# Patient Record
Sex: MALE | Race: BLACK OR AFRICAN AMERICAN | URBAN - METROPOLITAN AREA
[De-identification: names, ages, dates, MRNs, and addresses within clinical notes are randomized per-mention and may not be internally consistent; named-entity substitution may affect disease eponyms.]

---

## 2017-12-15 ENCOUNTER — OFFICE VISIT (OUTPATIENT)
Dept: FAMILY MEDICINE | Facility: CLINIC | Age: 27
End: 2017-12-15
Payer: MEDICARE

## 2017-12-15 ENCOUNTER — RADIANT APPOINTMENT (OUTPATIENT)
Dept: MRI IMAGING | Facility: CLINIC | Age: 27
End: 2017-12-15
Attending: INTERNAL MEDICINE
Payer: MEDICARE

## 2017-12-15 ENCOUNTER — RADIANT APPOINTMENT (OUTPATIENT)
Dept: GENERAL RADIOLOGY | Facility: CLINIC | Age: 27
End: 2017-12-15
Attending: INTERNAL MEDICINE
Payer: MEDICARE

## 2017-12-15 VITALS
SYSTOLIC BLOOD PRESSURE: 134 MMHG | BODY MASS INDEX: 41.75 KG/M2 | DIASTOLIC BLOOD PRESSURE: 89 MMHG | OXYGEN SATURATION: 94 % | HEART RATE: 81 BPM | TEMPERATURE: 98.7 F | WEIGHT: 315 LBS | HEIGHT: 73 IN

## 2017-12-15 DIAGNOSIS — S93.401A SEVERE ANKLE SPRAIN, RIGHT, INITIAL ENCOUNTER: Primary | ICD-10-CM

## 2017-12-15 DIAGNOSIS — S93.401A SEVERE ANKLE SPRAIN, RIGHT, INITIAL ENCOUNTER: ICD-10-CM

## 2017-12-15 PROBLEM — I10 ESSENTIAL HYPERTENSION: Status: ACTIVE | Noted: 2017-02-06

## 2017-12-15 PROBLEM — E66.01 MORBID OBESITY (H): Status: ACTIVE | Noted: 2017-12-15

## 2017-12-15 PROBLEM — R73.03 PREDIABETES: Status: ACTIVE | Noted: 2017-08-08

## 2017-12-15 PROBLEM — Z23 NEED FOR PROPHYLACTIC VACCINATION WITH TETANUS-DIPHTHERIA (TD): Status: ACTIVE | Noted: 2017-12-15

## 2017-12-15 PROCEDURE — 73610 X-RAY EXAM OF ANKLE: CPT | Mod: RT

## 2017-12-15 PROCEDURE — 99204 OFFICE O/P NEW MOD 45 MIN: CPT | Performed by: INTERNAL MEDICINE

## 2017-12-15 PROCEDURE — 73721 MRI JNT OF LWR EXTRE W/O DYE: CPT | Mod: TC

## 2017-12-15 RX ORDER — OLANZAPINE 20 MG/1
20 TABLET ORAL AT BEDTIME
COMMUNITY

## 2017-12-15 NOTE — MR AVS SNAPSHOT
After Visit Summary   12/15/2017    Makayla Pérez    MRN: [id number]           Patient Information     Date Of Birth          1990        Visit Information        Provider Department      12/15/2017 10:40 AM Harpal Cordoba MD [facility]        Today's Diagnoses     Severe ankle sprain, right, initial encounter    -  1      Care Instructions    At [facility], we strive to deliver an exceptional experience to you, every time we see you.  If you receive a survey in the mail, please send us back your thoughts. We really do value your feedback.    Based on your medical history, these are the current health maintenance/preventive care services that you are due for (some may have been done at this visit.)  Health Maintenance Due   Topic Date Due     TETANUS IMMUNIZATION (SYSTEM ASSIGNED)  05/10/2007     INFLUENZA VACCINE (SYSTEM ASSIGNED)  09/01/2017         Suggested websites for health information:  Www[facility] : Up to date and easily searchable information on multiple topics.  Www.medlineplus.gov : medication info, interactive tutorials, watch real surgeries online  Www.familydoctor.org : good info from the Academy of Family Physicians  Www.cdc.gov : public health info, travel advisories, epidemics (H1N1)  Www.aap.org : children's health info, normal development, vaccinations  Www.health.state.[place].us : [place] dept of health, public health issues in [place], N1N1    Your care team:                            Family Medicine Internal Medicine   MD Harpal Srivastava MD Shantel Branch-Fleming, MD Katya Georgiev PA-C Nam Ho, MD Pediatrics   DIEGO Denton, LIVIA Lawson APRN MD Hannah Ortega MD Deborah Mielke, MD Kim Thein, KATLIN CNP      Clinic hours: Monday - Thursday 7 am-7 pm; Fridays 7 am-5 pm.   Urgent care: Monday - Friday 11 am-9 pm; Saturday and Sunday 9 am-5 pm.  Pharmacy :  Monday -Thursday 8 am-8 pm; Friday 8 am-6 pm; Saturday and Sunday 9 am-5 pm.     Clinic: [phone]   Pharmacy: [phone]            Follow-ups after your visit        Additional Services     ORTHOPEDICS ADULT REFERRAL       Your provider has referred you to: [facility]: [facility] [phone]    [email or web address]    Please be aware that coverage of these services is subject to the terms and limitations of your health insurance plan.  Call member services at your health plan with any benefit or coverage questions.      Please bring the following to your appointment:    >>   Any x-rays, CTs or MRIs which have been performed.  Contact the facility where they were done to arrange for  prior to your scheduled appointment.    >>   List of current medications   >>   This referral request   >>   Any documents/labs given to you for this referral                  Future tests that were ordered for you today     Open Future Orders        Priority Expected Expires Ordered    MR Ankle Right w/o Contrast Routine 12/15/2017 12/15/2018 12/15/2017            Who to contact     If you have questions or need follow up information about today's clinic visit or your schedule please contact [facility] directly at [phone].  Normal or non-critical lab and imaging results will be communicated to you by MyChart, letter or phone within 4 business days after the clinic has received the results. If you do not hear from us within 7 days, please contact the clinic through MyChart or phone. If you have a critical or abnormal lab result, we will notify you by phone as soon as possible.  Submit refill requests through [employer] or call your pharmacy and they will forward the refill request to us. Please allow 3 business days for your refill to be completed.          Additional Information About Your Visit        [employer] Information     [employer] lets you  "send messages to your doctor, view your test results, renew your prescriptions, schedule appointments and more. To sign up, go to [email or web address] . Click on \"Log in\" on the left side of the screen, which will take you to the Welcome page. Then click on \"Sign up Now\" on the right side of the page.     You will be asked to enter the access code listed below, as well as some personal information. Please follow the directions to create your username and password.     Your access code is: [email or web address]  Expires: 3/15/2018 12:32 PM     Your access code will  in 90 days. If you need help or a new code, please call your [place] clinic or [phone].        Care EveryWhere ID     This is your Care EveryWhere ID. This could be used by other organizations to access your [place] medical records  [id number]        Your Vitals Were     Pulse Temperature Height Pulse Oximetry BMI (Body Mass Index)       81 98.7  F (37.1  C) (Oral) 6' 1\" (1.854 m) 94% 48.68 kg/m2        Blood Pressure from Last 3 Encounters:   12/15/17 134/89    Weight from Last 3 Encounters:   12/15/17 (!) 369 lb (167.4 kg)              We Performed the Following     ORTHOPEDICS ADULT REFERRAL     XR Ankle Right G/E 3 Views          Today's Medication Changes          These changes are accurate as of: 12/15/17 12:32 PM.  If you have any questions, ask your nurse or doctor.               Start taking these medicines.        Dose/Directions    diclofenac 50 MG EC tablet   Commonly known as:  VOLTAREN   Used for:  Severe ankle sprain, right, initial encounter   Started by:  Harpal Cordoba MD        Dose:  50 mg   Take 1 tablet (50 mg) by mouth 3 times daily (with meals)   Quantity:  90 tablet   Refills:  1            Where to get your medicines      These medications were sent to [facility] - [place], [place] - [address]  [address] [facility] [address]     Phone:  [phone]     diclofenac 50 MG EC tablet "                Primary Care Provider Fax #    Provider Not In System [phone]                Equal Access to Services     YOVANIESTRELLA MATEO : Hadii aad ku haddeannabrittany Vargas, wadomingoda sterling, ardhamarely sandravanessafreddie velez, jessica encarnacionsamirlenka whitt . So [facility] [phone].    ATENCIÓN: Si habla español, tiene a luna disposición servicios gratuitos de asistencia lingüística. Llame al [phone].    We comply with applicable federal civil rights laws and [place] laws. We do not discriminate on the basis of race, color, national origin, age, disability, sex, sexual orientation, or gender identity.            Thank you!     Thank you for choosing [facility]  for your care. Our goal is always to provide you with excellent care. Hearing back from our patients is one way we can continue to improve our services. Please take a few minutes to complete the written survey that you may receive in the mail after your visit with us. Thank you!             Your Updated Medication List - Protect others around you: Learn how to safely use, store and throw away your medicines at www.disposemymeds.org.          This list is accurate as of: 12/15/17 12:32 PM.  Always use your most recent med list.                   Brand Name Dispense Instructions for use Diagnosis    BENADRYL PO           diclofenac 50 MG EC tablet    VOLTAREN    90 tablet    Take 1 tablet (50 mg) by mouth 3 times daily (with meals)    Severe ankle sprain, right, initial encounter       LISINOPRIL-HYDROCHLOROTHIAZIDE PO           TOPAMAX PO           ZYPREXA 20 MG tablet   Generic drug:  OLANZapine      Take 20 mg by mouth At Bedtime

## 2017-12-15 NOTE — LETTER
December 19, 2017      Makayla Pérez  [address]  [facility] [address]        Dear ,    We are writing to inform you of your test results.    Your MRI of right ankle shows no evidence of any fractures.It shows complete tear of tibiofibular ligament (located at the lateral aspect of right ankle).   It also shows partial thickness tear of talofibular ligament (located in front of the right ankle).Schedule Ortho consultation by calling [phone], as discussed during visit.   Continue Diclofenac and apply ice frequently. For any questions, you may call my office at [phone].     Resulted Orders   MR Ankle Right w/o Contrast    Narrative    MR ANKLE RIGHT WITHOUT CONTRAST   12/15/2017 3:34 PM    HISTORY:  Right ankle pain since an injury yesterday.    COMPARISON: Radiographs earlier today.    TECHNIQUE: Multiplanar MR imaging was performed without contrast.    FINDINGS:    Osseous and Cartilaginous Structures: There is a small ossicle just  anterior to the inferior tip of the lateral malleolus. No fracture or  osseous lesion is demonstrated. No abnormal marrow signal intensity is  identified. The cartilage surfaces are well preserved. No talar dome  osteochondral lesion.    Posterior Tibial and Flexor Tendons:  No tear or significant  tendinosis of the posterior tibial tendon, flexor digitorum longus  tendon, or flexor hallucis longus tendon.     Peroneal Tendons:  No tear or significant tendinosis of the peroneal  brevis tendon or peroneal longus tendon.     Achilles Tendon:  No tear or significant tendinosis.     Extensor Tendons:  No tear or significant tendinosis of the anterior  tibial tendon, extensor hallucis longus tendon, or extensor digitorum  longus tendon.     Lateral Ligaments:  The fibular attachment of the anterior talofibular  ligament is not clearly seen. I suspect there is a tear of the  ligament, although this may be only partial thickness. The anterior  tibiofibular ligament  is completely torn. The calcaneofibular,  posterior talofibular, and posterior tibiofibular ligaments are  unremarkable.     Medial Deltoid Ligamentous Complex:  Unremarkable.     Plantar Fascia:  Unremarkable, with no findings to suggest active  plantar fasciitis.     Additional Findings: No significant  tibiotalar joint effusion. No  retrocalcaneal bursitis. No mass within the tarsal tunnel. The sinus  tarsi is unremarkable. There is prominent edema in the subcutaneous  tissues around the ankle, predominantly laterally. No other soft  tissue abnormality is seen.      Impression    IMPRESSION:    1. Complete tear of the anterior tibiofibular ligament with a  high-grade likely partial thickness tear of the anterior talofibular  ligament.  2. Diffuse surrounding soft tissue edema.    YAW IBRAHIM MD       If you have any questions or concerns, please call the clinic at the number listed above.       Sincerely,    Dr. Cordoba

## 2017-12-15 NOTE — PATIENT INSTRUCTIONS
At [facility], we strive to deliver an exceptional experience to you, every time we see you.  If you receive a survey in the mail, please send us back your thoughts. We really do value your feedback.    Based on your medical history, these are the current health maintenance/preventive care services that you are due for (some may have been done at this visit.)  Health Maintenance Due   Topic Date Due     TETANUS IMMUNIZATION (SYSTEM ASSIGNED)  05/10/2007     INFLUENZA VACCINE (SYSTEM ASSIGNED)  09/01/2017         Suggested websites for health information:  Www.[employer].org : Up to date and easily searchable information on multiple topics.  Www.[employer].gov : medication info, interactive tutorials, watch real surgeries online  Www.familydoctor.org : good info from the Academy of Family Physicians  Www.cdc.gov : public health info, travel advisories, epidemics (H1N1)  Www.aap.org : children's health info, normal development, vaccinations  Www.health.[facility].us : [place] dept of health, public health issues in [place], N1N1    Your care team:                            Family Medicine Internal Medicine   MD Harpal Srivastava MD Shantel Branch-Fleming, MD Katya Georgiev PA-C Nam Ho, MD Pediatrics   DIEGO Denton, LIVIA Lawson APRN CNP   MD Hannah Cabrera MD Deborah Mielke, MD Kim Thein, APRN CNP      Clinic hours: Monday - Thursday 7 am-7 pm; Fridays 7 am-5 pm.   Urgent care: Monday - Friday 11 am-9 pm; Saturday and Sunday 9 am-5 pm.  Pharmacy : Monday -Thursday 8 am-8 pm; Friday 8 am-6 pm; Saturday and Sunday 9 am-5 pm.     Clinic: [phone]   Pharmacy: [phone]

## 2017-12-15 NOTE — PROGRESS NOTES
SUBJECTIVE:   Makayla Pérez is a 27 year old male who presents to clinic today for the following health issues    Joint Pain    Onset: 1 day    Description:   Location: right foot  Character: Sharp and Stabbing    Intensity: moderate    Progression of Symptoms: same    Accompanying Signs & Symptoms:swelling and numbness of right ankle.    History:   Previous similar pain: no       Precipitating factors:   Trauma or overuse: YES- twisted right ankle while playing at home.    Alleviating factors:  Improved by: none  Therapies Tried and outcome: none            Problem list and histories reviewed & adjusted, as indicated.  Additional history: as documented    Patient Active Problem List   Diagnosis     Morbid obesity (H)     Severe ankle sprain, right, initial encounter     Antisocial personality disorder     Essential hypertension     Prediabetes     Schizoaffective disorder, bipolar type (H)     History reviewed. No pertinent surgical history.    Social History   Substance Use Topics     Smoking status: Current Every Day Smoker     Packs/day: 0.50     Types: Cigarettes     Smokeless tobacco: Never Used     Alcohol use No     History reviewed. No pertinent family history.      Not on File  No lab results found.   BP Readings from Last 3 Encounters:   12/15/17 134/89    Wt Readings from Last 3 Encounters:   12/15/17 (!) 167.4 kg (369 lb)                ROS:  CONSTITUTIONAL:POSITIVE  for morbid obesity.  I: NEGATIVE for worrisome rashes, moles or lesions  E: NEGATIVE for vision changes or irritation  E/M: NEGATIVE for ear, mouth and throat problems  R: NEGATIVE for significant cough or SOB  CV: NEGATIVE for chest pain, palpitations or peripheral edema  GI: NEGATIVE for nausea, abdominal pain, heartburn, or change in bowel habits  : NEGATIVE for frequency, dysuria, or hematuria  MUSCULOSKELETAL:As above.  N: NEGATIVE for weakness, dizziness or paresthesias  E: NEGATIVE for temperature intolerance, skin/hair  "changes  H: NEGATIVE for bleeding problems  PSYCHIATRIC: POSITIVE for schizoaffective bipolar disorder.    OBJECTIVE:     /89 (BP Location: Left arm, Patient Position: Chair, Cuff Size: Adult Large)  Pulse 81  Temp 98.7  F (37.1  C) (Oral)  Ht 1.854 m (6' 1\")  Wt (!) 167.4 kg (369 lb)  SpO2 94%  BMI 48.68 kg/m2  Body mass index is 48.68 kg/(m^2).  GENERAL: healthy, alert and no distress  EYES: Eyes grossly normal to inspection and conjunctivae and sclerae normal  HENT: normal cephalic/atraumatic and oral mucous membranes moist  RESP: lungs clear to auscultation - no rales, rhonchi or wheezes  CV: regular rate and rhythm, normal S1 S2, no S3 or S4, no murmur, click or rub, no peripheral edema and peripheral pulses strong  ABDOMEN: soft, nontender, no hepatosplenomegaly, no masses and bowel sounds normal  MS: Exquisite swelling and tenderness of right ankle and right foot.  SKIN: no suspicious lesions or rashes  NEURO: Normal strength and tone, mentation intact and speech normal  PSYCH: mentation appears normal, affect normal/bright    Diagnostic Test Results:  Results for orders placed or performed in visit on 12/15/17   XR Ankle Right G/E 3 Views    Narrative    ANKLE RIGHT THREE OR MORE VIEWS  12/15/2017 11:42 AM     HISTORY: Severe ankle sprain, right, initial encounter.    COMPARISON: None.      Impression    IMPRESSION: Lateral soft tissue swelling. Ankle mortise intact.  Well-corticated ossicle adjacent to the distal fibula. No definite  acute fracture.    RAYA WELLINGTON MD       ASSESSMENT/PLAN:     (S93.401A) Severe ankle sprain, right, initial encounter  (primary encounter diagnosis)  Comment: Despite severe right ankle swelling, there is o evidence of any fractures. Work ability letter provided after visit (no work for two weeks but adjust accordingly based on current condition). MRI justified as plain imaging does not wholly explained severe swelling.  Plan: XR Ankle Right G/E 3 Views, MR " Ankle Right w/o         Contrast, ORTHOPEDICS ADULT REFERRAL, order for      DME, diclofenac (VOLTAREN) 50 MG         EC tablet    Follow-up visit if condition worsens.    Harpal Cordoba MD  [facility]

## 2017-12-19 ENCOUNTER — TELEPHONE (OUTPATIENT)
Dept: FAMILY MEDICINE | Facility: CLINIC | Age: 27
End: 2017-12-19

## 2017-12-19 DIAGNOSIS — S93.401A SEVERE ANKLE SPRAIN, RIGHT, INITIAL ENCOUNTER: ICD-10-CM

## 2017-12-19 NOTE — TELEPHONE ENCOUNTER
Notes Recorded by Harpal Cordoba MD on 12/17/2017 at 9:27 PM  MRI of right ankle shows no evidence of any fractures.  It shows complete tear of tibiofibular ligament (located at the lateral aspect of right ankle).  It also shows partial thickness tear of talofibular ligament (located in front of the right ankle).  Schedule Ortho consultation by calling [phone] (remind patient as it was explained during his visit).  Continue Diclofenac and apply ice frequently.    (Call patient)    Patient informed of the above per provider documentation.     1) Patient states he has an appointment on Thursday. Upon review of chart it shows he has an appointment with podiatry. Provider, does patient need podiatry or was patient scheduled incorrectly? Patient did verbalize understanding he needs to schedule with Ortho and said he will do so.   3)Patient is having a hard time with the news of his torn ligaments and he said he cannot walk with the crutches. He would like to know if he can or should put weight on his right foot.   4) Patient wants his diclofenac sent to [facility]. I contacted our pharmacy and they think patient did not have his insurance card so that is why he did not get the Rx from our clinic pharmacy. Rx re-sent per patient's request to [facility].       Routing to provider to review and advise.   Lizeth Posey RN

## 2017-12-20 NOTE — TELEPHONE ENCOUNTER
Called and spoke with patient. Reviewed notes below per Dr. Cordoba regarding keeping appointment with podiatry, recommending non-weight bearing until appointment, and taking diclofenac as prescribed. Patient verbalized understanding. Patient concerned and worried he has worsened his injury as he has been putting weight on ankle since OV and not using the crutches as they are hard for him to maneuver and get around with. Reiterated with patient that he should be using them and stay off ankle as best as can until appointment tomorrow with podiatry. Patient agreed. Patient will also contact pharmacy for medication delivery. Writer also educated patient about using ice for treatment of swelling and to elevate ankle as often as able. Patient agreed.    Neena Jaramillo RN  [facility] Triage

## 2017-12-20 NOTE — TELEPHONE ENCOUNTER
1) Keep appointment with Podiatry on Thursday.  2) Due severe swelling of right ankle, recommend non-weight bearing until appointment with Podiatry.  3) Take Diclofenac once he receives it.

## 2017-12-22 ENCOUNTER — OFFICE VISIT (OUTPATIENT)
Dept: PODIATRY | Facility: CLINIC | Age: 27
End: 2017-12-22
Payer: MEDICARE

## 2017-12-22 VITALS — BODY MASS INDEX: 48.82 KG/M2 | HEART RATE: 78 BPM | OXYGEN SATURATION: 98 % | WEIGHT: 315 LBS

## 2017-12-22 DIAGNOSIS — S93.401A SEVERE ANKLE SPRAIN, RIGHT, INITIAL ENCOUNTER: Primary | ICD-10-CM

## 2017-12-22 PROCEDURE — 99203 OFFICE O/P NEW LOW 30 MIN: CPT | Performed by: PODIATRIST

## 2017-12-22 NOTE — NURSING NOTE
"Chief Complaint   Patient presents with     Ankle Pain     right ankle torn ligament       Initial Pulse 78  Wt (!) 370 lb (167.8 kg)  SpO2 98%  BMI 48.82 kg/m2 Estimated body mass index is 48.82 kg/(m^2) as calculated from the following:    Height as of 12/15/17: 6' 1\" (1.854 m).    Weight as of this encounter: 370 lb (167.8 kg).  Medication Reconciliation: complete  "

## 2017-12-22 NOTE — MR AVS SNAPSHOT
After Visit Summary   12/22/2017    Makayla Pérez    MRN: [id number]           Patient Information     Date Of Birth          1990        Visit Information        Provider Department      12/22/2017 9:00 AM Jered Miranda, [facility]        Care Instructions    SMOKING CESSATION  What's in cigarette smoke? - Cigarette smoke contains over 4,000 chemicals. Nicotine is one of the main ingredients which is an insecticide/herbicide. It is poisonous to our nervous system, increases blood clotting risk, and decreases the body's defenses to fight off infection. Another chemical is Carbon Monoxide is an asphyxiating gas that permanently binds to blood cells and blocks the transport of oxygen. This leads to tissue death and decreases your metabolism. Tar is a chemical that coats your lungs and trachea which impairs new oxygen coming in and carbon dioxide getting out of your body.   How does smoking impact surgery? - Smoking is particularly hazardous with regards to surgery. Surgery puts stress on the body and a smoker's body is already under strain from these chemicals. Putting the two together, especially for an elective surgery, could be a recipe for disaster. Smoking before and after surgery increases your risk of heart problems, slow wound healing, delayed bone healing, blood clots, wound infection and anesthesia complications.   What are the benefits of quitting? - In 20 minutes your blood pressure will drop back down to normal. In 8 hours the carbon monoxide (a toxic gas) levels in your blood stream will drop by half, and oxygen levels will return to normal. In 48 hours your chance of having a heart attack will have decreased. All nicotine will have left your body. Your sense of taste and smell will return to a normal level. In 72 hours your bronchial tubes will relax, and your energy levels will increase. In 2 weeks your circulation will increase, and it will continue  to improve for the next 10 weeks.    Recommendations for elective surgery - Ideally, patients should quit smoking 8 weeks before and at least 2 weeks after elective surgery in order to avoid complications. Simply cutting back on the amount of cigarettes smoked per day does not offer any benefit or decrease the risk of poor wound healing, heart problems, and infection. Smokers should also start taking Vitamin C and B for two weeks before surgery and two weeks after surgery.    Ways to Stop Smokin. Nicotine patches, lozenges, or gum  2. Support Groups  3. Medications (see below)    List of Medications:  1. Varenicline Tartrate (CHANTIX)   2. Bupropion HCL (WELLBUTRIN, ZYBAN) - note: make sure Wellbutrin is for smoking cessation and not other issues   3. Nicotine Patch (NICODERM)   4. Nicotine Inhaler (NICOTROL)   5. Nicotine Gum Nicotine Polacrilex   6. Nicotine Lozenge: Nicotine Polacrilex (COMMIT)   * [place] offers a smoking support group as well!  Please visit: [email or web address]  If you are interested in these, ask about getting a prescription or talk to your primary care doctor about what may be the best way for you to quit.       We wish you continued good healing. If you have any questions or concerns, please do not hesitate to contact us at [phone]      Please remember to call and schedule a follow up appointment if one was recommended at your earliest convenience.   PODIATRY CLINIC HOURS  TELEPHONE NUMBER    Dr. Jered Miranda D.P.M [facility]    Clinics:  [facility]        Charlotte Parisi MA  Medical Assistant  Tuesday 1PM-6PM  [place]  Wednesday 7AM-2PM  [facility]  Thursday 10AM-6PM  [place] 7AM-345PM  [facility]  Specialty schedulers:   [phone] to make an appointment with any Specialty Provider.        Urgent Care locations:    [facility] Monday-Friday 5 pm -  "9 pm. Saturday-Sunday 9 am -5pm    Monday-Friday 11 am - 9 pm Saturday 9 am - 5 pm     Monday-Sunday 12 noon-8PM [phone]     If you need a medication refill, please contact us you may need lab work and/or a follow up visit prior to your refill (i.e. Antifungal medications).    Quintileshart (secure e-mail communication and access to your chart) to send a message or to make an appointment.    If MRI needed please call Murali Rendon at [phone]        Weight management plan: Patient was referred to their PCP to discuss a diet and exercise plan.            Follow-ups after your visit        Your next 10 appointments already scheduled     Dec 22, 2017  9:00 AM CST   [facility] D[facility]   [facility]    [address] [place] [id number] [phone]              Who to contact     If you have questions or need follow up information about today's clinic visit or your schedule please contact [facility] directly at [phone].  Normal or non-critical lab and imaging results will be communicated to you by MyChart, letter or phone within 4 business days after the clinic has received the results. If you do not hear from us within 7 days, please contact the clinic through MyChart or phone. If you have a critical or abnormal lab result, we will notify you by phone as soon as possible.  Submit refill requests through [employer] or call your pharmacy and they will forward the refill request to us. Please allow 3 business days for your refill to be completed.          Additional Information About Your Visit        Quintileshart Information     Vimaginot lets you send messages to your doctor, view your test results, renew your prescriptions, schedule appointments and more. To sign up, go to [email or web address] . Click on \"Log in\" on the left side of the screen, " "which will take you to the Welcome page. Then click on \"Sign up Now\" on the right side of the page.     You will be asked to enter the access code listed below, as well as some personal information. Please follow the directions to create your username and password.     Your access code is: [email or web address]  Expires: 3/15/2018 12:32 PM     Your access code will  in 90 days. If you need help or a new code, please call your [facility] or [phone].        Care EveryWhere ID     This is your Care EveryWhere ID. This could be used by other organizations to access your [place] medical records  [id number]        Your Vitals Were     Pulse Pulse Oximetry BMI (Body Mass Index)             78 98% 48.82 kg/m2          Blood Pressure from Last 3 Encounters:   12/15/17 134/89    Weight from Last 3 Encounters:   17 (!) 370 lb (167.8 kg)   12/15/17 (!) 369 lb (167.4 kg)              Today, you had the following     No orders found for display       Primary Care Provider Fax #    Provider Not In System [phone]                Equal Access to Services     [facility]: Hadii aad ku hadasho Sonahomyali, waaxda luqadaha, qaybta kaalmada adeterriyada, jessica whitt . So [facility] [phone].    ATENCIÓN: Si habla español, tiene a luna disposición servicios gratuitos de asistencia lingüística. Llame al [phone].    We comply with applicable federal civil rights laws and [place] laws. We do not discriminate on the basis of race, color, national origin, age, disability, sex, sexual orientation, or gender identity.            Thank you!     Thank you for choosing [facility]  for your care. Our goal is always to provide you with excellent care. Hearing back from our patients is one way we can continue to improve our services. Please take a few minutes to complete the written survey that you may receive in the mail after your visit with us. Thank you!             Your " Updated Medication List - Protect others around you: Learn how to safely use, store and throw away your medicines at www.disposemymeds.org.          This list is accurate as of: 12/22/17  8:56 AM.  Always use your most recent med list.                   Brand Name Dispense Instructions for use Diagnosis    BENADRYL PO           diclofenac 50 MG EC tablet    VOLTAREN    90 tablet    Take 1 tablet (50 mg) by mouth 3 times daily (with meals)    Severe ankle sprain, right, initial encounter       LISINOPRIL-HYDROCHLOROTHIAZIDE PO           order for DME     2 each    Equipment being ordered: Crutches    Severe ankle sprain, right, initial encounter       TOPAMAX PO           ZYPREXA 20 MG tablet   Generic drug:  OLANZapine      Take 20 mg by mouth At Bedtime

## 2017-12-22 NOTE — PROGRESS NOTES
Subjective:    Patient seen as a new patient consult from Dr. Cordoba and is seen today  for right ankle sprain.  Had inversion sprain while roughhousing with a friend on 12/14/17.  Had pain and edema lateral ankle.  Aggravated by activity and relieved by rest.  Seen in clinic, xrays taken, and mri ordered because so painful.  Her to discussed results.  Lives in group home.  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  Using ace bandage and wearing shoes.  No past problems here before.  Smoker.             ROS:  denies numbness, erythema, shortness of breath       Allergies   Allergen Reactions     Haloperidol Other (See Comments)     Risperidone Other (See Comments)       Current Outpatient Prescriptions   Medication Sig Dispense Refill     diclofenac (VOLTAREN) 50 MG EC tablet Take 1 tablet (50 mg) by mouth 3 times daily (with meals) 90 tablet 1     OLANZapine (ZYPREXA) 20 MG tablet Take 20 mg by mouth At Bedtime       LISINOPRIL-HYDROCHLOROTHIAZIDE PO        Topiramate (TOPAMAX PO)        DiphenhydrAMINE HCl (BENADRYL PO)        order for DME Equipment being ordered: Crutches 2 each 0       Patient Active Problem List   Diagnosis     Morbid obesity (H)     Severe ankle sprain, right, initial encounter     Antisocial personality disorder     Essential hypertension     Prediabetes     Schizoaffective disorder, bipolar type (H)       No past medical history on file.    No past surgical history on file.    No family history on file.    Social History   Substance Use Topics     Smoking status: Current Every Day Smoker     Packs/day: 0.50     Types: Cigarettes     Smokeless tobacco: Never Used     Alcohol use No       Objective:    Pulse 78  Wt (!) 370 lb (167.8 kg)  SpO2 98%  BMI 48.82 kg/m2.  Patient pleasant to talk with and in no apparent distress.   Wearing worn pair of shoes today.  CRT < 3 seconds all toes.  No varicosities noted.   +2/4 DP & PT bilateral, sensation to light touch is intact.  Achilles reflex 2/4  bilateral.  Normal arch with weight bearing.  Muscle strength 5/5 in all compartments.  No pain with stressing any tendons.  Normal ROM all forefoot and rearfoot joints.     Lateral ankle slight edema.  negative ecchymosis  negative erythema  negative pain at TMTJs or styloid process.  negative Achilles or calcaneal tubercle pain  negative medial ankle pain  positive pain AITF ligament and this seems to be where most of his pain is.  Slight pain over ATFL  No pain CFL  negative pain with stressing or palpation peroneal tendons  negative peroneal subluxation  negative pain over medial or lateral malleoli    ANKLE RIGHT THREE OR MORE VIEWS  12/15/2017 11:42 AM      HISTORY: Severe ankle sprain, right, initial encounter.     COMPARISON: None.         IMPRESSION: Lateral soft tissue swelling. Ankle mortise intact.  Well-corticated ossicle adjacent to the distal fibula. No definite  acute fracture.    MR ANKLE RIGHT WITHOUT CONTRAST   12/15/2017 3:34 PM     HISTORY:  Right ankle pain since an injury yesterday.     COMPARISON: Radiographs earlier today.     TECHNIQUE: Multiplanar MR imaging was performed without contrast.     FINDINGS:     Osseous and Cartilaginous Structures: There is a small ossicle just  anterior to the inferior tip of the lateral malleolus. No fracture or  osseous lesion is demonstrated. No abnormal marrow signal intensity is  identified. The cartilage surfaces are well preserved. No talar dome  osteochondral lesion.     Posterior Tibial and Flexor Tendons:  No tear or significant  tendinosis of the posterior tibial tendon, flexor digitorum longus  tendon, or flexor hallucis longus tendon.      Peroneal Tendons:  No tear or significant tendinosis of the peroneal  brevis tendon or peroneal longus tendon.      Achilles Tendon:  No tear or significant tendinosis.      Extensor Tendons:  No tear or significant tendinosis of the anterior  tibial tendon, extensor hallucis longus tendon, or extensor  digitorum  longus tendon.      Lateral Ligaments:  The fibular attachment of the anterior talofibular  ligament is not clearly seen. I suspect there is a tear of the  ligament, although this may be only partial thickness. The anterior  tibiofibular ligament is completely torn. The calcaneofibular,  posterior talofibular, and posterior tibiofibular ligaments are  unremarkable.      Medial Deltoid Ligamentous Complex:  Unremarkable.      Plantar Fascia:  Unremarkable, with no findings to suggest active  plantar fasciitis.      Additional Findings: No significant  tibiotalar joint effusion. No  retrocalcaneal bursitis. No mass within the tarsal tunnel. The sinus  tarsi is unremarkable. There is prominent edema in the subcutaneous  tissues around the ankle, predominantly laterally. No other soft  tissue abnormality is seen.         IMPRESSION:    1. Complete tear of the anterior tibiofibular ligament with a  high-grade likely partial thickness tear of the anterior talofibular  ligament.  2. Diffuse surrounding soft tissue edema.    Assessment:  Right high ankle sprain                          Right ATF rupture       Plan:  X-rays and MRI personally reviewed.  Discussed etiology and treatment options with the patient in detail.  Warned that nader ankle sprain with rupture of ligament are slow to heal.   Discussed PRICE.   Dispensed F8 ankle brace to walk in and this felt very comfortable, secure.   No work for next two weeks, and then will try to work again using ankle brace and sitting when needed.  Return to clinic in 3 weeks.   Thank you for allowing me participate in the care of this patient.        Jered Miranda DPM, FACFAS

## 2017-12-22 NOTE — LETTER
12/22/2017         RE: Makayla Pérez  [address] [place] [address]        Dear Colleague,    Thank you for referring your patient, Makayla Pérez, to the [facility]. Please see a copy of my visit note below.    Subjective:    Patient seen as a new patient consult from Dr. Cordoba and is seen today  for right ankle sprain.  Had inversion sprain while roughhousing with a friend on 12/14/17.  Had pain and edema lateral ankle.  Aggravated by activity and relieved by rest.  Seen in clinic, xrays taken, and mri ordered because so painful.  Her to discussed results.  Lives in group home.  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  Using ace bandage and wearing shoes.  No past problems here before.  Smoker.             ROS:  denies numbness, erythema, shortness of breath       Allergies   Allergen Reactions     Haloperidol Other (See Comments)     Risperidone Other (See Comments)       Current Outpatient Prescriptions   Medication Sig Dispense Refill     diclofenac (VOLTAREN) 50 MG EC tablet Take 1 tablet (50 mg) by mouth 3 times daily (with meals) 90 tablet 1     OLANZapine (ZYPREXA) 20 MG tablet Take 20 mg by mouth At Bedtime       LISINOPRIL-HYDROCHLOROTHIAZIDE PO        Topiramate (TOPAMAX PO)        DiphenhydrAMINE HCl (BENADRYL PO)        order for DME Equipment being ordered: Crutches 2 each 0       Patient Active Problem List   Diagnosis     Morbid obesity (H)     Severe ankle sprain, right, initial encounter     Antisocial personality disorder     Essential hypertension     Prediabetes     Schizoaffective disorder, bipolar type (H)       No past medical history on file.    No past surgical history on file.    No family history on file.    Social History   Substance Use Topics     Smoking status: Current Every Day Smoker     Packs/day: 0.50     Types: Cigarettes     Smokeless tobacco: Never Used     Alcohol use No       Objective:    Pulse 78  Wt (!) 370 lb (167.8 kg)  SpO2 98%   BMI 48.82 kg/m2.  Patient pleasant to talk with and in no apparent distress.   Wearing worn pair of shoes today.  CRT < 3 seconds all toes.  No varicosities noted.   +2/4 DP & PT bilateral, sensation to light touch is intact.  Achilles reflex 2/4 bilateral.  Normal arch with weight bearing.  Muscle strength 5/5 in all compartments.  No pain with stressing any tendons.  Normal ROM all forefoot and rearfoot joints.     Lateral ankle slight edema.  negative ecchymosis  negative erythema  negative pain at TMTJs or styloid process.  negative Achilles or calcaneal tubercle pain  negative medial ankle pain  positive pain AITF ligament and this seems to be where most of his pain is.  Slight pain over ATFL  No pain CFL  negative pain with stressing or palpation peroneal tendons  negative peroneal subluxation  negative pain over medial or lateral malleoli    ANKLE RIGHT THREE OR MORE VIEWS  12/15/2017 11:42 AM      HISTORY: Severe ankle sprain, right, initial encounter.     COMPARISON: None.         IMPRESSION: Lateral soft tissue swelling. Ankle mortise intact.  Well-corticated ossicle adjacent to the distal fibula. No definite  acute fracture.    MR ANKLE RIGHT WITHOUT CONTRAST   12/15/2017 3:34 PM     HISTORY:  Right ankle pain since an injury yesterday.     COMPARISON: Radiographs earlier today.     TECHNIQUE: Multiplanar MR imaging was performed without contrast.     FINDINGS:     Osseous and Cartilaginous Structures: There is a small ossicle just  anterior to the inferior tip of the lateral malleolus. No fracture or  osseous lesion is demonstrated. No abnormal marrow signal intensity is  identified. The cartilage surfaces are well preserved. No talar dome  osteochondral lesion.     Posterior Tibial and Flexor Tendons:  No tear or significant  tendinosis of the posterior tibial tendon, flexor digitorum longus  tendon, or flexor hallucis longus tendon.      Peroneal Tendons:  No tear or significant tendinosis of the  peroneal  brevis tendon or peroneal longus tendon.      Achilles Tendon:  No tear or significant tendinosis.      Extensor Tendons:  No tear or significant tendinosis of the anterior  tibial tendon, extensor hallucis longus tendon, or extensor digitorum  longus tendon.      Lateral Ligaments:  The fibular attachment of the anterior talofibular  ligament is not clearly seen. I suspect there is a tear of the  ligament, although this may be only partial thickness. The anterior  tibiofibular ligament is completely torn. The calcaneofibular,  posterior talofibular, and posterior tibiofibular ligaments are  unremarkable.      Medial Deltoid Ligamentous Complex:  Unremarkable.      Plantar Fascia:  Unremarkable, with no findings to suggest active  plantar fasciitis.      Additional Findings: No significant  tibiotalar joint effusion. No  retrocalcaneal bursitis. No mass within the tarsal tunnel. The sinus  tarsi is unremarkable. There is prominent edema in the subcutaneous  tissues around the ankle, predominantly laterally. No other soft  tissue abnormality is seen.         IMPRESSION:    1. Complete tear of the anterior tibiofibular ligament with a  high-grade likely partial thickness tear of the anterior talofibular  ligament.  2. Diffuse surrounding soft tissue edema.    Assessment:  Right high ankle sprain                          Right ATF rupture       Plan:  X-rays and MRI personally reviewed.  Discussed etiology and treatment options with the patient in detail.  Warned that nader ankle sprain with rupture of ligament are slow to heal.   Discussed PRICE.   Dispensed F8 ankle brace to walk in and this felt very comfortable, secure.   No work for next two weeks, and then will try to work again using ankle brace and sitting when needed.  Return to clinic in 3 weeks.   Thank you for allowing me participate in the care of this patient.        Jered Miranda DPM, FACFAS        Again, thank you for allowing me to  participate in the care of your patient.        Sincerely,        Jered Miranda DPM

## 2017-12-22 NOTE — LETTER
[address] [address]  Phone: [phone]    December 22, 2017        Makayla Pérez  [address] [facility] [address]          To whom it may concern:    RE: Makayla Pérez    Patient was seen and treated today at our clinic.  Patient may return to work Jan 2nd 2018 with the following:  Must wear Ankle Brace while working. Please allow Makayla to rest his foot when needed.    When the patient returns to work, the following restrictions apply until 2/2/18      Please contact me for questions or concerns.      Sincerely,        Dr. Jered Miranda D.P.M FAC FAS

## 2017-12-22 NOTE — PATIENT INSTRUCTIONS
SMOKING CESSATION  What's in cigarette smoke? - Cigarette smoke contains over 4,000 chemicals. Nicotine is one of the main ingredients which is an insecticide/herbicide. It is poisonous to our nervous system, increases blood clotting risk, and decreases the body's defenses to fight off infection. Another chemical is Carbon Monoxide is an asphyxiating gas that permanently binds to blood cells and blocks the transport of oxygen. This leads to tissue death and decreases your metabolism. Tar is a chemical that coats your lungs and trachea which impairs new oxygen coming in and carbon dioxide getting out of your body.   How does smoking impact surgery? - Smoking is particularly hazardous with regards to surgery. Surgery puts stress on the body and a smoker's body is already under strain from these chemicals. Putting the two together, especially for an elective surgery, could be a recipe for disaster. Smoking before and after surgery increases your risk of heart problems, slow wound healing, delayed bone healing, blood clots, wound infection and anesthesia complications.   What are the benefits of quitting? - In 20 minutes your blood pressure will drop back down to normal. In 8 hours the carbon monoxide (a toxic gas) levels in your blood stream will drop by half, and oxygen levels will return to normal. In 48 hours your chance of having a heart attack will have decreased. All nicotine will have left your body. Your sense of taste and smell will return to a normal level. In 72 hours your bronchial tubes will relax, and your energy levels will increase. In 2 weeks your circulation will increase, and it will continue to improve for the next 10 weeks.    Recommendations for elective surgery - Ideally, patients should quit smoking 8 weeks before and at least 2 weeks after elective surgery in order to avoid complications. Simply cutting back on the amount of cigarettes smoked per day does not offer any benefit or decrease the  risk of poor wound healing, heart problems, and infection. Smokers should also start taking Vitamin C and B for two weeks before surgery and two weeks after surgery.    Ways to Stop Smokin. Nicotine patches, lozenges, or gum  2. Support Groups  3. Medications (see below)    List of Medications:  1. Varenicline Tartrate (CHANTIX)   2. Bupropion HCL (WELLBUTRIN, ZYBAN)   note: make sure Wellbutrin is for smoking cessation and not other issues   3. Nicotine Patch (NICODERM)   4. Nicotine Inhaler (NICOTROL)   5. Nicotine Gum Nicotine Polacrilex   6. Nicotine Lozenge: Nicotine Polacrilex (COMMIT)   * [employer] offers a smoking support group as well!  Please visit: [email or web address]  If you are interested in these, ask about getting a prescription or talk to your primary care doctor about what may be the best way for you to quit.       We wish you continued good healing. If you have any questions or concerns, please do not hesitate to contact us at [phone]      Please remember to call and schedule a follow up appointment if one was recommended at your earliest convenience.   PODIATRY CLINIC HOURS  TELEPHONE NUMBER    Dr. Jered CAGLEPSHAI FAC FAS    Clinics:  [facility]        Charlotte Parisi MA  Medical Assistant  Tuesday 1PM-6PM  [facility]  Wednesday 7AM-2PM  [place]  Thursday 10AM-6PM  [place] 7AM-345PM  [place]  Specialty schedulers:   [phone] to make an appointment with any Specialty Provider.        Urgent Care locations:    [facility] Monday-Friday 5 pm - 9 pm. Saturday- 9 am -5pm    Monday-Friday 11 am - 9 pm Saturday 9 am - 5 pm     Monday- 12 noon-8PM [phone]     If you need a medication refill, please contact us you may need lab work and/or a follow up visit prior to your refill (i.e. Antifungal  medications).    [facility] (secure e-mail communication and access to your chart) to send a message or to make an appointment.    If MRI needed please call Murali Rendon at [phone]        Weight management plan: Patient was referred to their PCP to discuss a diet and exercise plan.

## 2018-01-12 ENCOUNTER — OFFICE VISIT (OUTPATIENT)
Dept: PODIATRY | Facility: CLINIC | Age: 28
End: 2018-01-12
Payer: MEDICARE

## 2018-01-12 VITALS — HEIGHT: 73 IN | OXYGEN SATURATION: 100 % | HEART RATE: 90 BPM

## 2018-01-12 DIAGNOSIS — B35.1 DERMATOPHYTOSIS OF NAIL: ICD-10-CM

## 2018-01-12 DIAGNOSIS — S93.401A SEVERE ANKLE SPRAIN, RIGHT, INITIAL ENCOUNTER: Primary | ICD-10-CM

## 2018-01-12 PROCEDURE — 99213 OFFICE O/P EST LOW 20 MIN: CPT | Performed by: PODIATRIST

## 2018-01-12 NOTE — MR AVS SNAPSHOT
After Visit Summary   1/12/2018    Makayla Pérez    MRN: [id number]           Patient Information     Date Of Birth          1990        Visit Information        Provider Department      1/12/2018 9:45 AM Jered Miranda, PENELOPE [facility]        Today's Diagnoses     Severe ankle sprain, right, initial encounter    -  1    Dermatophytosis of nail          Care Instructions    SMOKING CESSATION  What's in cigarette smoke? - Cigarette smoke contains over 4,000 chemicals. Nicotine is one of the main ingredients which is an insecticide/herbicide. It is poisonous to our nervous system, increases blood clotting risk, and decreases the body's defenses to fight off infection. Another chemical is Carbon Monoxide is an asphyxiating gas that permanently binds to blood cells and blocks the transport of oxygen. This leads to tissue death and decreases your metabolism. Tar is a chemical that coats your lungs and trachea which impairs new oxygen coming in and carbon dioxide getting out of your body.   How does smoking impact surgery? - Smoking is particularly hazardous with regards to surgery. Surgery puts stress on the body and a smoker's body is already under strain from these chemicals. Putting the two together, especially for an elective surgery, could be a recipe for disaster. Smoking before and after surgery increases your risk of heart problems, slow wound healing, delayed bone healing, blood clots, wound infection and anesthesia complications.   What are the benefits of quitting? - In 20 minutes your blood pressure will drop back down to normal. In 8 hours the carbon monoxide (a toxic gas) levels in your blood stream will drop by half, and oxygen levels will return to normal. In 48 hours your chance of having a heart attack will have decreased. All nicotine will have left your body. Your sense of taste and smell will return to a normal level. In 72 hours your bronchial tubes will  relax, and your energy levels will increase. In 2 weeks your circulation will increase, and it will continue to improve for the next 10 weeks.    Recommendations for elective surgery - Ideally, patients should quit smoking 8 weeks before and at least 2 weeks after elective surgery in order to avoid complications. Simply cutting back on the amount of cigarettes smoked per day does not offer any benefit or decrease the risk of poor wound healing, heart problems, and infection. Smokers should also start taking Vitamin C and B for two weeks before surgery and two weeks after surgery.    Ways to Stop Smokin. Nicotine patches, lozenges, or gum  2. Support Groups  3. Medications (see below)    List of Medications:  1. Varenicline Tartrate (CHANTIX)   2. Bupropion HCL (WELLBUTRIN, ZYBAN) - note: make sure Wellbutrin is for smoking cessation and not other issues   3. Nicotine Patch (NICODERM)   4. Nicotine Inhaler (NICOTROL)   5. Nicotine Gum Nicotine Polacrilex   6. Nicotine Lozenge: Nicotine Polacrilex (COMMIT)   * [employer] offers a smoking support group as well!  Please visit: [email or web address]  If you are interested in these, ask about getting a prescription or talk to your primary care doctor about what may be the best way for you to quit.     Weight management plan: Patient was referred to their PCP to discuss a diet and exercise plan.            Follow-ups after your visit        Who to contact     If you have questions or need follow up information about today's clinic visit or your schedule please contact [facility] directly at [phone].  Normal or non-critical lab and imaging results will be communicated to you by MyChart, letter or phone within 4 business days after the clinic has received the results. If you do not hear from us within 7 days, please contact the clinic through MyChart or phone. If you have a critical or abnormal lab result, we will notify you  "by phone as soon as possible.  Submit refill requests through [employer] or call your pharmacy and they will forward the refill request to us. Please allow 3 business days for your refill to be completed.          Additional Information About Your Visit        [employer] Information     [employer]agi lets you send messages to your doctor, view your test results, renew your prescriptions, schedule appointments and more. To sign up, go to [email or web address] . Click on \"Log in\" on the left side of the screen, which will take you to the Welcome page. Then click on \"Sign up Now\" on the right side of the page.     You will be asked to enter the access code listed below, as well as some personal information. Please follow the directions to create your username and password.     Your access code is: [email or web address]  Expires: 3/15/2018 12:32 PM     Your access code will  in 90 days. If you need help or a new code, please call your [facility] clinic or [phone].        Care EveryWhere ID     This is your Care EveryWhere ID. This could be used by other organizations to access your [facility] medical records  [id number]        Your Vitals Were     Pulse Height Pulse Oximetry             90 6' 1\" (1.854 m) 100%          Blood Pressure from Last 3 Encounters:   12/15/17 134/89    Weight from Last 3 Encounters:   17 (!) 370 lb (167.8 kg)   12/15/17 (!) 369 lb (167.4 kg)              Today, you had the following     No orders found for display         Today's Medication Changes          These changes are accurate as of: 18 10:10 AM.  If you have any questions, ask your nurse or doctor.               Stop taking these medicines if you haven't already. Please contact your care team if you have questions.     order for DME   Stopped by:  Jered Miranda, PENELOPE                    Primary Care Provider Fax #    Provider Not In System [phone]                Equal Access to Services     PENNIE GALINDO AH: Mary Beth rojas " Sam, griceldada luinduadaha, qaowenta kaalrohith velez, jessica meryin hayaan hanyterri shashankshikha labrittanybrenden yannick. So [facility] [phone].    ATENCIÓN: Si michael mooney, tiene a luna disposición servicios gratuitos de asistencia lingüística. Iva al [phone].    We comply with applicable federal civil rights laws and [place] laws. We do not discriminate on the basis of race, color, national origin, age, disability, sex, sexual orientation, or gender identity.            Thank you!     Thank you for choosing [facility]  for your care. Our goal is always to provide you with excellent care. Hearing back from our patients is one way we can continue to improve our services. Please take a few minutes to complete the written survey that you may receive in the mail after your visit with us. Thank you!             Your Updated Medication List - Protect others around you: Learn how to safely use, store and throw away your medicines at www.disposemymeds.org.          This list is accurate as of: 1/12/18 10:10 AM.  Always use your most recent med list.                   Brand Name Dispense Instructions for use Diagnosis    BENADRYL PO           diclofenac 50 MG EC tablet    VOLTAREN    90 tablet    Take 1 tablet (50 mg) by mouth 3 times daily (with meals)    Severe ankle sprain, right, initial encounter       LISINOPRIL-HYDROCHLOROTHIAZIDE PO           TOPAMAX PO           ZYPREXA 20 MG tablet   Generic drug:  OLANZapine      Take 20 mg by mouth At Bedtime

## 2018-01-12 NOTE — LETTER
1/12/2018         RE: Makayla Pérez  [address] [place] [address]        Dear Colleague,    Thank you for referring your patient, Makayla Pérez, to the [facility]. Please see a copy of my visit note below.    Subjective:    12/22/17  Patient seen as a new patient consult from Dr. Cordoba and is seen today  for right ankle sprain.  Had inversion sprain while roughhousing with a friend on 12/14/17.  Had pain and edema lateral ankle.  Aggravated by activity and relieved by rest.  Seen in clinic, xrays taken, and mri ordered because so painful.  Her to discussed results.  Lives in group home.  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  Using ace bandage and wearing shoes.  No past problems here before.  Smoker.      1/12/18  Ankle much better and can work with no pain or edema now.  Is concerned about right fifth toe because cant move as easy as left.  Also on lamisil for 5 months for nails.  Wondering about trimming nails.  No pain from these.           ROS:  denies numbness, erythema, increased deformio       Allergies   Allergen Reactions     Haloperidol Other (See Comments)     Risperidone Other (See Comments)       Current Outpatient Prescriptions   Medication Sig Dispense Refill     diclofenac (VOLTAREN) 50 MG EC tablet Take 1 tablet (50 mg) by mouth 3 times daily (with meals) 90 tablet 1     OLANZapine (ZYPREXA) 20 MG tablet Take 20 mg by mouth At Bedtime       LISINOPRIL-HYDROCHLOROTHIAZIDE PO        Topiramate (TOPAMAX PO)        DiphenhydrAMINE HCl (BENADRYL PO)          Patient Active Problem List   Diagnosis     Morbid obesity (H)     Severe ankle sprain, right, initial encounter     Antisocial personality disorder     Essential hypertension     Prediabetes     Schizoaffective disorder, bipolar type (H)       No past medical history on file.    No past surgical history on file.    No family history on file.    Social History   Substance Use Topics     Smoking status: Current  "Every Day Smoker     Packs/day: 0.50     Types: Cigarettes     Smokeless tobacco: Never Used     Alcohol use No       Objective:    Pulse 90  Ht 6' 1\" (1.854 m)  SpO2 100%.  Patient pleasant to talk with and in no apparent distress.   Wearing worn pair of shoes today.  CRT < 3 seconds all toes.  No varicosities noted.   +2/4 DP & PT bilateral, sensation to light touch is intact.  Achilles reflex 2/4 bilateral.  Normal arch with weight bearing.  Muscle strength 5/5 in all compartments.  No pain with stressing any tendons.  Normal ROM all forefoot and rearfoot joints.  Skin dry.  Hallux nails mycotic.       Lateral ankle no edema.  negative ecchymosis  negative erythema  negative pain at TMTJs or styloid process.  negative Achilles or calcaneal tubercle pain  negative medial ankle pain  No pain AITF ligament.  No pain over ATFL  No pain CFL  negative pain with stressing or palpation peroneal tendons  negative peroneal subluxation  negative pain over medial or lateral malleoli    ANKLE RIGHT THREE OR MORE VIEWS  12/15/2017 11:42 AM      HISTORY: Severe ankle sprain, right, initial encounter.     COMPARISON: None.         IMPRESSION: Lateral soft tissue swelling. Ankle mortise intact.  Well-corticated ossicle adjacent to the distal fibula. No definite  acute fracture.    MR ANKLE RIGHT WITHOUT CONTRAST   12/15/2017 3:34 PM     HISTORY:  Right ankle pain since an injury yesterday.     COMPARISON: Radiographs earlier today.     TECHNIQUE: Multiplanar MR imaging was performed without contrast.     FINDINGS:     Osseous and Cartilaginous Structures: There is a small ossicle just  anterior to the inferior tip of the lateral malleolus. No fracture or  osseous lesion is demonstrated. No abnormal marrow signal intensity is  identified. The cartilage surfaces are well preserved. No talar dome  osteochondral lesion.     Posterior Tibial and Flexor Tendons:  No tear or significant  tendinosis of the posterior tibial tendon, " flexor digitorum longus  tendon, or flexor hallucis longus tendon.      Peroneal Tendons:  No tear or significant tendinosis of the peroneal  brevis tendon or peroneal longus tendon.      Achilles Tendon:  No tear or significant tendinosis.      Extensor Tendons:  No tear or significant tendinosis of the anterior  tibial tendon, extensor hallucis longus tendon, or extensor digitorum  longus tendon.      Lateral Ligaments:  The fibular attachment of the anterior talofibular  ligament is not clearly seen. I suspect there is a tear of the  ligament, although this may be only partial thickness. The anterior  tibiofibular ligament is completely torn. The calcaneofibular,  posterior talofibular, and posterior tibiofibular ligaments are  unremarkable.      Medial Deltoid Ligamentous Complex:  Unremarkable.      Plantar Fascia:  Unremarkable, with no findings to suggest active  plantar fasciitis.      Additional Findings: No significant  tibiotalar joint effusion. No  retrocalcaneal bursitis. No mass within the tarsal tunnel. The sinus  tarsi is unremarkable. There is prominent edema in the subcutaneous  tissues around the ankle, predominantly laterally. No other soft  tissue abnormality is seen.         IMPRESSION:    1. Complete tear of the anterior tibiofibular ligament with a  high-grade likely partial thickness tear of the anterior talofibular  ligament.  2. Diffuse surrounding soft tissue edema.    Can move right fifth toe.     Assessment:  Right high ankle sprain                          Right ATF rupture                          Onychomycosis        Plan:  Sprain  much better.  Activities as tolerated.  Explained fifth toe ROM will get better as ankle heals.  he will continue Lamisil per primary care.  Instructed him on trimming his nails and getting a .  Return to clinic prn.           Jered Miranda DPM, FACFAS        Again, thank you for allowing me to participate in the care of your patient.         Sincerely,        Jered Miranda, PENELOPE

## 2018-01-12 NOTE — PROGRESS NOTES
"Subjective:    12/22/17  Patient seen as a new patient consult from Dr. Cordoba and is seen today  for right ankle sprain.  Had inversion sprain while roughhousing with a friend on 12/14/17.  Had pain and edema lateral ankle.  Aggravated by activity and relieved by rest.  Seen in clinic, xrays taken, and mri ordered because so painful.  Her to discussed results.  Lives in group home.  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  Using ace bandage and wearing shoes.  No past problems here before.  Smoker.      1/12/18  Ankle much better and can work with no pain or edema now.  Is concerned about right fifth toe because cant move as easy as left.  Also on lamisil for 5 months for nails.  Wondering about trimming nails.  No pain from these.           ROS:  denies numbness, erythema, increased deformio       Allergies   Allergen Reactions     Haloperidol Other (See Comments)     Risperidone Other (See Comments)       Current Outpatient Prescriptions   Medication Sig Dispense Refill     diclofenac (VOLTAREN) 50 MG EC tablet Take 1 tablet (50 mg) by mouth 3 times daily (with meals) 90 tablet 1     OLANZapine (ZYPREXA) 20 MG tablet Take 20 mg by mouth At Bedtime       LISINOPRIL-HYDROCHLOROTHIAZIDE PO        Topiramate (TOPAMAX PO)        DiphenhydrAMINE HCl (BENADRYL PO)          Patient Active Problem List   Diagnosis     Morbid obesity (H)     Severe ankle sprain, right, initial encounter     Antisocial personality disorder     Essential hypertension     Prediabetes     Schizoaffective disorder, bipolar type (H)       No past medical history on file.    No past surgical history on file.    No family history on file.    Social History   Substance Use Topics     Smoking status: Current Every Day Smoker     Packs/day: 0.50     Types: Cigarettes     Smokeless tobacco: Never Used     Alcohol use No       Objective:    Pulse 90  Ht 6' 1\" (1.854 m)  SpO2 100%.  Patient pleasant to talk with and in no apparent distress.   Wearing worn " pair of shoes today.  CRT < 3 seconds all toes.  No varicosities noted.   +2/4 DP & PT bilateral, sensation to light touch is intact.  Achilles reflex 2/4 bilateral.  Normal arch with weight bearing.  Muscle strength 5/5 in all compartments.  No pain with stressing any tendons.  Normal ROM all forefoot and rearfoot joints.  Skin dry.  Hallux nails mycotic.       Lateral ankle no edema.  negative ecchymosis  negative erythema  negative pain at TMTJs or styloid process.  negative Achilles or calcaneal tubercle pain  negative medial ankle pain  No pain AITF ligament.  No pain over ATFL  No pain CFL  negative pain with stressing or palpation peroneal tendons  negative peroneal subluxation  negative pain over medial or lateral malleoli    ANKLE RIGHT THREE OR MORE VIEWS  12/15/2017 11:42 AM      HISTORY: Severe ankle sprain, right, initial encounter.     COMPARISON: None.         IMPRESSION: Lateral soft tissue swelling. Ankle mortise intact.  Well-corticated ossicle adjacent to the distal fibula. No definite  acute fracture.    MR ANKLE RIGHT WITHOUT CONTRAST   12/15/2017 3:34 PM     HISTORY:  Right ankle pain since an injury yesterday.     COMPARISON: Radiographs earlier today.     TECHNIQUE: Multiplanar MR imaging was performed without contrast.     FINDINGS:     Osseous and Cartilaginous Structures: There is a small ossicle just  anterior to the inferior tip of the lateral malleolus. No fracture or  osseous lesion is demonstrated. No abnormal marrow signal intensity is  identified. The cartilage surfaces are well preserved. No talar dome  osteochondral lesion.     Posterior Tibial and Flexor Tendons:  No tear or significant  tendinosis of the posterior tibial tendon, flexor digitorum longus  tendon, or flexor hallucis longus tendon.      Peroneal Tendons:  No tear or significant tendinosis of the peroneal  brevis tendon or peroneal longus tendon.      Achilles Tendon:  No tear or significant tendinosis.      Extensor  Tendons:  No tear or significant tendinosis of the anterior  tibial tendon, extensor hallucis longus tendon, or extensor digitorum  longus tendon.      Lateral Ligaments:  The fibular attachment of the anterior talofibular  ligament is not clearly seen. I suspect there is a tear of the  ligament, although this may be only partial thickness. The anterior  tibiofibular ligament is completely torn. The calcaneofibular,  posterior talofibular, and posterior tibiofibular ligaments are  unremarkable.      Medial Deltoid Ligamentous Complex:  Unremarkable.      Plantar Fascia:  Unremarkable, with no findings to suggest active  plantar fasciitis.      Additional Findings: No significant  tibiotalar joint effusion. No  retrocalcaneal bursitis. No mass within the tarsal tunnel. The sinus  tarsi is unremarkable. There is prominent edema in the subcutaneous  tissues around the ankle, predominantly laterally. No other soft  tissue abnormality is seen.         IMPRESSION:    1. Complete tear of the anterior tibiofibular ligament with a  high-grade likely partial thickness tear of the anterior talofibular  ligament.  2. Diffuse surrounding soft tissue edema.    Can move right fifth toe.     Assessment:  Right high ankle sprain                          Right ATF rupture                          Onychomycosis        Plan:  Sprain  much better.  Activities as tolerated.  Explained fifth toe ROM will get better as ankle heals.  he will continue Lamisil per primary care.  Instructed him on trimming his nails and getting a .  Return to clinic prn.           Jered Miranda DPM, FACFAS

## 2018-01-12 NOTE — PATIENT INSTRUCTIONS
SMOKING CESSATION  What's in cigarette smoke? - Cigarette smoke contains over 4,000 chemicals. Nicotine is one of the main ingredients which is an insecticide/herbicide. It is poisonous to our nervous system, increases blood clotting risk, and decreases the body's defenses to fight off infection. Another chemical is Carbon Monoxide is an asphyxiating gas that permanently binds to blood cells and blocks the transport of oxygen. This leads to tissue death and decreases your metabolism. Tar is a chemical that coats your lungs and trachea which impairs new oxygen coming in and carbon dioxide getting out of your body.   How does smoking impact surgery? - Smoking is particularly hazardous with regards to surgery. Surgery puts stress on the body and a smoker's body is already under strain from these chemicals. Putting the two together, especially for an elective surgery, could be a recipe for disaster. Smoking before and after surgery increases your risk of heart problems, slow wound healing, delayed bone healing, blood clots, wound infection and anesthesia complications.   What are the benefits of quitting? - In 20 minutes your blood pressure will drop back down to normal. In 8 hours the carbon monoxide (a toxic gas) levels in your blood stream will drop by half, and oxygen levels will return to normal. In 48 hours your chance of having a heart attack will have decreased. All nicotine will have left your body. Your sense of taste and smell will return to a normal level. In 72 hours your bronchial tubes will relax, and your energy levels will increase. In 2 weeks your circulation will increase, and it will continue to improve for the next 10 weeks.    Recommendations for elective surgery - Ideally, patients should quit smoking 8 weeks before and at least 2 weeks after elective surgery in order to avoid complications. Simply cutting back on the amount of cigarettes smoked per day does not offer any benefit or decrease the  risk of poor wound healing, heart problems, and infection. Smokers should also start taking Vitamin C and B for two weeks before surgery and two weeks after surgery.    Ways to Stop Smokin. Nicotine patches, lozenges, or gum  2. Support Groups  3. Medications (see below)    List of Medications:  1. Varenicline Tartrate (CHANTIX)   2. Bupropion HCL (WELLBUTRIN, ZYBAN)   note: make sure Wellbutrin is for smoking cessation and not other issues   3. Nicotine Patch (NICODERM)   4. Nicotine Inhaler (NICOTROL)   5. Nicotine Gum Nicotine Polacrilex   6. Nicotine Lozenge: Nicotine Polacrilex (COMMIT)   * [facility] offers a smoking support group as well!  Please visit: [email or web address]  If you are interested in these, ask about getting a prescription or talk to your primary care doctor about what may be the best way for you to quit.     Weight management plan: Patient was referred to their PCP to discuss a diet and exercise plan.

## 2018-05-31 ENCOUNTER — TELEPHONE (OUTPATIENT)
Dept: FAMILY MEDICINE | Facility: CLINIC | Age: 28
End: 2018-05-31

## 2018-05-31 NOTE — TELEPHONE ENCOUNTER
[facility] contacted. They are unsure who call the clinic and what was unclear about the instructions. They took down my name and clinic phone in case someone from [facility] still needed to call back.    Jacobo Sr CMA

## 2018-05-31 NOTE — TELEPHONE ENCOUNTER
returned call    Best number to reach caller: [facility] [phone]    Is it ok to leave a detailed message: NO

## 2018-05-31 NOTE — TELEPHONE ENCOUNTER
Reason for Call:  Other prescription    Detailed comments: Geritom needs dose on medication Diclofenac 50 mg is unclear. Please call back and advise.    Phone Number Patient can be reached at: [phone]    Best Time: any    Can we leave a detailed message on this number? YES    Call taken on 5/31/2018 at 2:46 PM by Adriane Longoria

## 2018-06-01 NOTE — TELEPHONE ENCOUNTER
"Called [facility] back and she states that she needs to know the instructiuon on the medication for Diclofenac 50 mg. She states that the medication was marked PRN at their facility. I told her that Dr. Cordoba sigEveline is \"TAKE 1 TABLET BY MOUTH THREE TIMES DAILY WITH MEALS\" per Dr. Cordoba. Droothy Ribera MA      "

## 2018-06-01 NOTE — TELEPHONE ENCOUNTER
returned call    Best number to reach caller: [facility] [phone]    Is it ok to leave a detailed message: YES

## 2018-06-01 NOTE — TELEPHONE ENCOUNTER
This writer attempted to contact [facility] on 06/01/18      Reason for call medication questions and left message.      If patient calls back:   Patient contacted by 1st floor [facility] Team (MA/TC). Inform patient that someone from the team will contact them, document that pt called and route to care team.         Jacobo Sr

## 2021-05-30 ENCOUNTER — RECORDS - HEALTHEAST (OUTPATIENT)
Dept: ADMINISTRATIVE | Facility: CLINIC | Age: 31
End: 2021-05-30